# Patient Record
Sex: FEMALE | Race: WHITE | NOT HISPANIC OR LATINO | URBAN - METROPOLITAN AREA
[De-identification: names, ages, dates, MRNs, and addresses within clinical notes are randomized per-mention and may not be internally consistent; named-entity substitution may affect disease eponyms.]

---

## 2023-06-19 ENCOUNTER — EMERGENCY (EMERGENCY)
Facility: HOSPITAL | Age: 28
LOS: 1 days | Discharge: HOME | End: 2023-06-19
Attending: EMERGENCY MEDICINE | Admitting: EMERGENCY MEDICINE
Payer: COMMERCIAL

## 2023-06-19 VITALS
HEIGHT: 64 IN | SYSTOLIC BLOOD PRESSURE: 112 MMHG | RESPIRATION RATE: 16 BRPM | DIASTOLIC BLOOD PRESSURE: 78 MMHG | HEART RATE: 76 BPM | OXYGEN SATURATION: 97 % | TEMPERATURE: 99 F | WEIGHT: 108.03 LBS

## 2023-06-19 LAB — D DIMER BLD IA.RAPID-MCNC: <187 NG/ML DDU — SIGNIFICANT CHANGE UP

## 2023-06-19 PROCEDURE — 99284 EMERGENCY DEPT VISIT MOD MDM: CPT

## 2023-06-19 NOTE — ED PROVIDER NOTE - PHYSICAL EXAMINATION
PE: A&Ox3, well appearing, NAD, NCAT, regular respiratory effort, moving all four extremities equally. SILT and equal to BLE. No swelling to the BLE, no calf tenderness to palpation, no palpable cord in the LLE.

## 2023-06-19 NOTE — ED ADULT TRIAGE NOTE - CHIEF COMPLAINT QUOTE
Pt with complaint of numbness to the left leg that started around 1130pm on Saturday night. Reports she got a PRP injection to her left foot on Friday for achilles tendonitis. Pt concerned she may have a blood clot.  Denies any SOB, CP.

## 2023-06-19 NOTE — ED ADULT NURSE NOTE - AS PAIN REST
Condition:: Vein
Please Describe Your Condition:: is being seen for a chief complaint of a Vein. Patient complains of a vein on her right foot that is irritated. She presents for evaluation and management.
0 (no pain/absence of nonverbal indicators of pain)

## 2023-06-19 NOTE — ED PROVIDER NOTE - PATIENT PORTAL LINK FT
Daily Note     Today's date: 2022  Patient name: Jacqueline Rodrigez  : [birth year]  MRN: [id number]  Referring provider: Linnette Silva DO  Dx:   Encounter Diagnosis     ICD-10-CM    1  Left hip pain  M25 552      Start Time: 845  Stop Time: 930  Total time in clinic (min): 45 minutes  Subjective: Patient reports continuation of lateral thigh pain when turning over in bed, walking, and when moving her leg  Objective: See treatment diary below     Assessment: Tolerated treatment well  Educated patient on muscle imbalance and compensatory movements with moving into hip IR with ambulation and with heel slide for assessment  Significant difficulty with gluteal activation and hip ER muscle activation requiring tactile cueing for proper execution  Patient reported no pain with ambulation post session  Plan: Continue per plan of care  Insurance:  AMA/CMS Eval/ Re-eval POC expires Anisaanalia Farias #/ Referral # Total    Start date  Expiration date Extension  Co-Insurance   CMS 5 5 22 7 28 22                                  Precautions: HTN, OP  DOS 5 2 22  HEP: quad set, glute set, ankle pumps, stand hip abd, heel slide     Manuals 5 5 5 9 5 11 5 13 5 16 5 18 22   visit 1- post op 2 3 4 5 6   Soft tissue quad  FB FB np  FB desensitization                     Neuro Re-Ed         Feet together   5*10'' EC CCG 5*10'' EC CCG np    Clam shell      3*8 pillow b/w knees                     Ther Ex         HEP 10'        Pt edu    7'  10'   LAQ 10x 2*10 3*10 3*10 3*15 HOLD   Nu step st 6  5', lvl 1 6' lvl 1 7' lvl 1 7' lvl 1 7' lvl 1   Stand hip abd   2*10 LLE moving, 1*10 RLE moving 2*10 ea  2*10 ea  2*10 ea  2*10 LLE coupled with extn   Stand hip extn  2*10 3*10 ea  3*10 ea  3*10 ea      Calf raise  2*10 3*10 2*10 3*10 3*10   squats   2*10 2*10 3*10 3*10   Stand hip flexion   10x 2*10 2*10    Hip PROM   FB FB FB FB   Supine hip IR/ER rolling    FB     Supine hip add      3*10    Supine hip flexion     2*15    Side stepping 2 laps 15'    glute set      2*10 in supine and stand                              Ther Activity                           Gait Training         SPC   5' np  education            Modalities                             Short Term: from Date of surgery  1  Pt will report decreased levels of pain by at least 2 subjective ratings in 4 weeks  2  Pt will demonstrate improved ROM by at least 10 degrees in 4 weeks  3  Pt will demonstrate improved strength by 1/2 grade in 4 weeks  4  Pt will be able to ambulate without AD in 4 weeks  Long Term:   1  Pt will be independent in their HEP in 8 weeks  2  Pt will be pain free with IADL's in 8 weeks  3  Pt will be able to perform reciprocal stair navigation in 8 weeks  4  Pt will return to PLOF in 10 weeks with regards to recreational activities  You can access the FollowMyHealth Patient Portal offered by [facility] by registering at the following website: [email or web address]. By joining [employer]’s FollowMyHealth portal, you will also be able to view your health information using other applications (apps) compatible with our system.

## 2023-06-19 NOTE — ED PROVIDER NOTE - CLINICAL SUMMARY MEDICAL DECISION MAKING FREE TEXT BOX
28-year-old female no significant past medical history presents for evaluation of left lower leg paresthesias after receiving a PRP traction for Achilles tendinitis 2 days ago.  Patient noticed that she began having what she describes as a tingling sensation around her ankle and distal calf and became concerned that it might be something significant so came to the emergency department for further evaluation.  She denies chest pain, shortness of breath, leg swelling, motor changes, infectious symptoms.  Takes oral contraceptives but otherwise no risk factors for DVT.    MDM: Female patient with left lower leg paresthesias following PRP injection.  Likely due to mild nerve compression from inflammatory response due to injection, but will assess for potential DVT using a D-dimer as patient is very low risk and if negative discharge.  No central nervous system findings to be concerned of.

## 2023-06-19 NOTE — ED PROVIDER NOTE - NSFOLLOWUPINSTRUCTIONS_ED_ALL_ED_FT
Your blood test was negative and the chance of having a blood clot is negligible. Please follow up with your primary physician this week.     WHAT YOU NEED TO KNOW:    Paresthesia is numbness, tingling, or burning. It can happen in any part of your body, but usually occurs in your legs, feet, arms, or hands.    DISCHARGE INSTRUCTIONS:    Return to the emergency department if:     You have severe pain along with numbness and tingling.  Your legs suddenly become cold. You have trouble moving your legs, and they ache.  You have increased weakness in a part of your body.  You have uncontrolled movements.    Contact your healthcare provider or neurologist if:     Your symptoms do not improve.  You have symptoms in more than one part of your body.  You have questions or concerns about your condition or care.    Manage paresthesia:     Protect the area from injury. You may injure or burn yourself if you lose feeling in the area. Be careful when you touch anything that could be hot. Wear sturdy shoes to protect your feet. Ask about other ways to protect yourself.   Go to physical or occupational therapy if directed. Your provider may recommend therapy if you have a condition such as carpal tunnel syndrome. A physical therapist can teach you exercises to help strengthen the area or increase your ability to move it. An occupational therapist can help you find new ways to do your daily activities.  Manage health conditions that can cause paresthesia. Work with your diabetes specialist if you have uncontrolled diabetes. A dietitian or your healthcare provider can help you create a meal plan if you have low vitamin B levels. Your provider can help you manage your health if you have multiple sclerosis or you had a stroke. It is important to manage health conditions to stop paresthesia or prevent it from getting worse.  Follow up with your healthcare provider or neurologist as directed: Your healthcare provider may refer you to a specialist. Write down your questions so you remember to ask them during your visits.

## 2023-06-19 NOTE — ED PROVIDER NOTE - OBJECTIVE STATEMENT
28-year-old female no significant past medical history presents for evaluation of left lower leg paresthesias after receiving a PRP traction for Achilles tendinitis 2 days ago.  Patient noticed that she began having what she describes as a tingling sensation around her ankle and distal calf and became concerned that it might be something significant so came to the emergency department for further evaluation.  She denies chest pain, shortness of breath, leg swelling, motor changes, infectious symptoms.  Takes oral contraceptives but otherwise no risk factors for DVT.

## 2023-06-19 NOTE — ED ADULT NURSE NOTE - BEFAST ARM NUMBNESS
Use eyedrops or ointment in affected eyes as prescribed  Follow-up with Optometrist/Pediatrician in the next 1-2 days for further evaluation and treatment  Go to the ED if any  fevers, unable to stay hydrated, change in vision, headache, facial swelling redness warmth tenderness, eye pain, pain with eye movement, abdominal pain, chest pain, shortness of breath, new or worsening symptoms or other concerning symptoms  Conjunctivitis   AMBULATORY CARE:   Conjunctivitis,  or pink eye, is inflammation of your conjunctiva  The conjunctiva is a thin tissue that covers the front of your eye and the back of your eyelids  The conjunctiva helps protect your eye and keep it moist  Conjunctivitis may be caused by bacteria, allergies, or a virus  If your conjunctivitis is caused by bacteria, it may get better on its own in about 7 days  Viral conjunctivitis can last up to 3 weeks  Common symptoms may include any of the following: You will usually have symptoms in both eyes if your conjunctivitis is caused by allergies  You may also have other allergic symptoms, such as a rash or runny nose  Symptoms will usually start in 1 eye if your conjunctivitis is caused by a virus or bacteria  · Redness in the whites of your eye    · Itching in your eye or around your eye    · Feeling like there is something in your eye    · Watery or thick, sticky discharge    · Crusty eyelids when you wake up in the morning    · Burning, stinging, or swelling in your eye    · Pain when you see bright light  Seek care immediately if:   · You have worsening eye pain  · The swelling in your eye gets worse, even after treatment  · Your vision suddenly becomes worse or you cannot see at all  Contact your healthcare provider if:   · You develop a fever and ear pain  · You have tiny bumps or spots of blood on your eye  · You have questions or concerns about your condition or care    Treatment  will depend on the cause of your conjunctivitis  You may need antibiotics or allergy medicine as a pill, eye drop, or eye ointment  Manage your symptoms:   · Apply a cool compress  Wet a washcloth with cold water and place it on your eye  This will help decrease itching and irritation  · Do not wear contact lenses  They can irritate your eye  Throw away the pair you are using and ask when you can wear them again  Use a new pair of lenses when your healthcare provider says it is okay  · Avoid irritants  Stay away from smoke filled areas  Shield your eyes from wind and sun  · Flush your eye  You may need to flush your eye with saline to help decrease your symptoms  Ask for more information on how to flush your eye  Medicines:  Treatment depends on what is causing your conjunctivitis  You may be given any of the following:  · Allergy medicine  helps decrease itchy, red, swollen eyes caused by allergies  It may be given as a pill, eye drops, or nasal spray  · Antibiotics  may be needed if your conjunctivitis is caused by bacteria  This medicine may be given as a pill, eye drops, or eye ointment  · Take your medicine as directed  Contact your healthcare provider if you think your medicine is not helping or if you have side effects  Tell him or her if you are allergic to any medicine  Keep a list of the medicines, vitamins, and herbs you take  Include the amounts, and when and why you take them  Bring the list or the pill bottles to follow-up visits  Carry your medicine list with you in case of an emergency  Prevent the spread of conjunctivitis:   · Wash your hands with soap and water often  Wash your hands before and after you touch your eyes  Also wash your hands before you prepare or eat food and after you use the bathroom or change a diaper  · Avoid allergens  Try to avoid the things that cause your allergies, such as pets, dust, or grass  · Avoid contact with others  Do not share towels or washcloths   Try to stay away from others as much as possible  Ask when you can return to work or school  · Throw away eye makeup  The bacteria that caused your conjunctivitis can stay in eye makeup  Throw away mascara and other eye makeup  © 2017 [address] Information is for End User's use only and may not be sold, redistributed or otherwise used for commercial purposes  All illustrations and images included in CareNotes® are the copyrighted property of A D A M [employer] Inc  or Bharat Simmons  The above information is an  only  It is not intended as medical advice for individual conditions or treatments  Talk to your doctor, nurse or pharmacist before following any medical regimen to see if it is safe and effective for you  No

## 2023-06-22 DIAGNOSIS — R20.0 ANESTHESIA OF SKIN: ICD-10-CM

## 2023-06-22 DIAGNOSIS — R20.2 PARESTHESIA OF SKIN: ICD-10-CM

## 2023-06-22 DIAGNOSIS — Z88.0 ALLERGY STATUS TO PENICILLIN: ICD-10-CM

## 2023-06-22 DIAGNOSIS — J45.909 UNSPECIFIED ASTHMA, UNCOMPLICATED: ICD-10-CM

## 2023-06-22 DIAGNOSIS — Z88.1 ALLERGY STATUS TO OTHER ANTIBIOTIC AGENTS STATUS: ICD-10-CM

## 2023-12-05 ENCOUNTER — TRANSCRIPTION ENCOUNTER (OUTPATIENT)
Age: 28
End: 2023-12-05

## 2023-12-05 ENCOUNTER — INPATIENT (INPATIENT)
Facility: HOSPITAL | Age: 28
LOS: 0 days | Discharge: ANOTHER HOSPITAL | DRG: 74 | End: 2023-12-05
Attending: PSYCHIATRY & NEUROLOGY | Admitting: PSYCHIATRY & NEUROLOGY
Payer: COMMERCIAL

## 2023-12-05 VITALS
TEMPERATURE: 98 F | HEIGHT: 64 IN | HEART RATE: 78 BPM | OXYGEN SATURATION: 97 % | DIASTOLIC BLOOD PRESSURE: 78 MMHG | RESPIRATION RATE: 18 BRPM | WEIGHT: 110.01 LBS | SYSTOLIC BLOOD PRESSURE: 116 MMHG

## 2023-12-05 VITALS — TEMPERATURE: 99 F

## 2023-12-05 DIAGNOSIS — E87.6 HYPOKALEMIA: ICD-10-CM

## 2023-12-05 DIAGNOSIS — L70.9 ACNE, UNSPECIFIED: ICD-10-CM

## 2023-12-05 DIAGNOSIS — J45.909 UNSPECIFIED ASTHMA, UNCOMPLICATED: ICD-10-CM

## 2023-12-05 DIAGNOSIS — R20.0 ANESTHESIA OF SKIN: ICD-10-CM

## 2023-12-05 LAB
ALBUMIN SERPL ELPH-MCNC: 3.7 G/DL — SIGNIFICANT CHANGE UP (ref 3.4–5)
ALBUMIN SERPL ELPH-MCNC: 3.7 G/DL — SIGNIFICANT CHANGE UP (ref 3.4–5)
ALP SERPL-CCNC: 51 U/L — SIGNIFICANT CHANGE UP (ref 40–120)
ALP SERPL-CCNC: 51 U/L — SIGNIFICANT CHANGE UP (ref 40–120)
ALT FLD-CCNC: 27 U/L — SIGNIFICANT CHANGE UP (ref 12–42)
ALT FLD-CCNC: 27 U/L — SIGNIFICANT CHANGE UP (ref 12–42)
ANION GAP SERPL CALC-SCNC: 6 MMOL/L — LOW (ref 9–16)
ANION GAP SERPL CALC-SCNC: 6 MMOL/L — LOW (ref 9–16)
APTT BLD: 28.8 SEC — SIGNIFICANT CHANGE UP (ref 24.5–35.6)
APTT BLD: 28.8 SEC — SIGNIFICANT CHANGE UP (ref 24.5–35.6)
AST SERPL-CCNC: 21 U/L — SIGNIFICANT CHANGE UP (ref 15–37)
AST SERPL-CCNC: 21 U/L — SIGNIFICANT CHANGE UP (ref 15–37)
BASOPHILS # BLD AUTO: 0.06 K/UL — SIGNIFICANT CHANGE UP (ref 0–0.2)
BASOPHILS # BLD AUTO: 0.06 K/UL — SIGNIFICANT CHANGE UP (ref 0–0.2)
BASOPHILS NFR BLD AUTO: 0.7 % — SIGNIFICANT CHANGE UP (ref 0–2)
BASOPHILS NFR BLD AUTO: 0.7 % — SIGNIFICANT CHANGE UP (ref 0–2)
BILIRUB SERPL-MCNC: 0.2 MG/DL — SIGNIFICANT CHANGE UP (ref 0.2–1.2)
BILIRUB SERPL-MCNC: 0.2 MG/DL — SIGNIFICANT CHANGE UP (ref 0.2–1.2)
BUN SERPL-MCNC: 11 MG/DL — SIGNIFICANT CHANGE UP (ref 7–23)
BUN SERPL-MCNC: 11 MG/DL — SIGNIFICANT CHANGE UP (ref 7–23)
CALCIUM SERPL-MCNC: 8.9 MG/DL — SIGNIFICANT CHANGE UP (ref 8.5–10.5)
CALCIUM SERPL-MCNC: 8.9 MG/DL — SIGNIFICANT CHANGE UP (ref 8.5–10.5)
CHLORIDE SERPL-SCNC: 103 MMOL/L — SIGNIFICANT CHANGE UP (ref 96–108)
CHLORIDE SERPL-SCNC: 103 MMOL/L — SIGNIFICANT CHANGE UP (ref 96–108)
CO2 SERPL-SCNC: 30 MMOL/L — SIGNIFICANT CHANGE UP (ref 22–31)
CO2 SERPL-SCNC: 30 MMOL/L — SIGNIFICANT CHANGE UP (ref 22–31)
CREAT SERPL-MCNC: 0.76 MG/DL — SIGNIFICANT CHANGE UP (ref 0.5–1.3)
CREAT SERPL-MCNC: 0.76 MG/DL — SIGNIFICANT CHANGE UP (ref 0.5–1.3)
EGFR: 110 ML/MIN/1.73M2 — SIGNIFICANT CHANGE UP
EGFR: 110 ML/MIN/1.73M2 — SIGNIFICANT CHANGE UP
EOSINOPHIL # BLD AUTO: 0.21 K/UL — SIGNIFICANT CHANGE UP (ref 0–0.5)
EOSINOPHIL # BLD AUTO: 0.21 K/UL — SIGNIFICANT CHANGE UP (ref 0–0.5)
EOSINOPHIL NFR BLD AUTO: 2.6 % — SIGNIFICANT CHANGE UP (ref 0–6)
EOSINOPHIL NFR BLD AUTO: 2.6 % — SIGNIFICANT CHANGE UP (ref 0–6)
GLUCOSE BLDC GLUCOMTR-MCNC: 95 MG/DL — SIGNIFICANT CHANGE UP (ref 70–99)
GLUCOSE BLDC GLUCOMTR-MCNC: 95 MG/DL — SIGNIFICANT CHANGE UP (ref 70–99)
GLUCOSE SERPL-MCNC: 101 MG/DL — HIGH (ref 70–99)
GLUCOSE SERPL-MCNC: 101 MG/DL — HIGH (ref 70–99)
HCG SERPL-ACNC: <1 MIU/ML — SIGNIFICANT CHANGE UP
HCG SERPL-ACNC: <1 MIU/ML — SIGNIFICANT CHANGE UP
HCT VFR BLD CALC: 40 % — SIGNIFICANT CHANGE UP (ref 34.5–45)
HCT VFR BLD CALC: 40 % — SIGNIFICANT CHANGE UP (ref 34.5–45)
HGB BLD-MCNC: 14.1 G/DL — SIGNIFICANT CHANGE UP (ref 11.5–15.5)
HGB BLD-MCNC: 14.1 G/DL — SIGNIFICANT CHANGE UP (ref 11.5–15.5)
IMM GRANULOCYTES NFR BLD AUTO: 0.2 % — SIGNIFICANT CHANGE UP (ref 0–0.9)
IMM GRANULOCYTES NFR BLD AUTO: 0.2 % — SIGNIFICANT CHANGE UP (ref 0–0.9)
INR BLD: 0.88 — SIGNIFICANT CHANGE UP (ref 0.85–1.18)
INR BLD: 0.88 — SIGNIFICANT CHANGE UP (ref 0.85–1.18)
LYMPHOCYTES # BLD AUTO: 2.67 K/UL — SIGNIFICANT CHANGE UP (ref 1–3.3)
LYMPHOCYTES # BLD AUTO: 2.67 K/UL — SIGNIFICANT CHANGE UP (ref 1–3.3)
LYMPHOCYTES # BLD AUTO: 33 % — SIGNIFICANT CHANGE UP (ref 13–44)
LYMPHOCYTES # BLD AUTO: 33 % — SIGNIFICANT CHANGE UP (ref 13–44)
MAGNESIUM SERPL-MCNC: 2 MG/DL — SIGNIFICANT CHANGE UP (ref 1.6–2.6)
MAGNESIUM SERPL-MCNC: 2 MG/DL — SIGNIFICANT CHANGE UP (ref 1.6–2.6)
MCHC RBC-ENTMCNC: 32.3 PG — SIGNIFICANT CHANGE UP (ref 27–34)
MCHC RBC-ENTMCNC: 32.3 PG — SIGNIFICANT CHANGE UP (ref 27–34)
MCHC RBC-ENTMCNC: 35.3 GM/DL — SIGNIFICANT CHANGE UP (ref 32–36)
MCHC RBC-ENTMCNC: 35.3 GM/DL — SIGNIFICANT CHANGE UP (ref 32–36)
MCV RBC AUTO: 91.5 FL — SIGNIFICANT CHANGE UP (ref 80–100)
MCV RBC AUTO: 91.5 FL — SIGNIFICANT CHANGE UP (ref 80–100)
MONOCYTES # BLD AUTO: 0.99 K/UL — HIGH (ref 0–0.9)
MONOCYTES # BLD AUTO: 0.99 K/UL — HIGH (ref 0–0.9)
MONOCYTES NFR BLD AUTO: 12.2 % — SIGNIFICANT CHANGE UP (ref 2–14)
MONOCYTES NFR BLD AUTO: 12.2 % — SIGNIFICANT CHANGE UP (ref 2–14)
NEUTROPHILS # BLD AUTO: 4.15 K/UL — SIGNIFICANT CHANGE UP (ref 1.8–7.4)
NEUTROPHILS # BLD AUTO: 4.15 K/UL — SIGNIFICANT CHANGE UP (ref 1.8–7.4)
NEUTROPHILS NFR BLD AUTO: 51.3 % — SIGNIFICANT CHANGE UP (ref 43–77)
NEUTROPHILS NFR BLD AUTO: 51.3 % — SIGNIFICANT CHANGE UP (ref 43–77)
NRBC # BLD: 0 /100 WBCS — SIGNIFICANT CHANGE UP (ref 0–0)
NRBC # BLD: 0 /100 WBCS — SIGNIFICANT CHANGE UP (ref 0–0)
PLATELET # BLD AUTO: 254 K/UL — SIGNIFICANT CHANGE UP (ref 150–400)
PLATELET # BLD AUTO: 254 K/UL — SIGNIFICANT CHANGE UP (ref 150–400)
POTASSIUM SERPL-MCNC: 3.4 MMOL/L — LOW (ref 3.5–5.3)
POTASSIUM SERPL-MCNC: 3.4 MMOL/L — LOW (ref 3.5–5.3)
POTASSIUM SERPL-SCNC: 3.4 MMOL/L — LOW (ref 3.5–5.3)
POTASSIUM SERPL-SCNC: 3.4 MMOL/L — LOW (ref 3.5–5.3)
PROT SERPL-MCNC: 6.9 G/DL — SIGNIFICANT CHANGE UP (ref 6.4–8.2)
PROT SERPL-MCNC: 6.9 G/DL — SIGNIFICANT CHANGE UP (ref 6.4–8.2)
PROTHROM AB SERPL-ACNC: 10 SEC — SIGNIFICANT CHANGE UP (ref 9.5–13)
PROTHROM AB SERPL-ACNC: 10 SEC — SIGNIFICANT CHANGE UP (ref 9.5–13)
RBC # BLD: 4.37 M/UL — SIGNIFICANT CHANGE UP (ref 3.8–5.2)
RBC # BLD: 4.37 M/UL — SIGNIFICANT CHANGE UP (ref 3.8–5.2)
RBC # FLD: 11.6 % — SIGNIFICANT CHANGE UP (ref 10.3–14.5)
RBC # FLD: 11.6 % — SIGNIFICANT CHANGE UP (ref 10.3–14.5)
SODIUM SERPL-SCNC: 139 MMOL/L — SIGNIFICANT CHANGE UP (ref 132–145)
SODIUM SERPL-SCNC: 139 MMOL/L — SIGNIFICANT CHANGE UP (ref 132–145)
TROPONIN I, HIGH SENSITIVITY RESULT: 4.4 NG/L — SIGNIFICANT CHANGE UP
TROPONIN I, HIGH SENSITIVITY RESULT: 4.4 NG/L — SIGNIFICANT CHANGE UP
WBC # BLD: 8.1 K/UL — SIGNIFICANT CHANGE UP (ref 3.8–10.5)
WBC # BLD: 8.1 K/UL — SIGNIFICANT CHANGE UP (ref 3.8–10.5)
WBC # FLD AUTO: 8.1 K/UL — SIGNIFICANT CHANGE UP (ref 3.8–10.5)
WBC # FLD AUTO: 8.1 K/UL — SIGNIFICANT CHANGE UP (ref 3.8–10.5)

## 2023-12-05 PROCEDURE — 85610 PROTHROMBIN TIME: CPT

## 2023-12-05 PROCEDURE — 85025 COMPLETE CBC W/AUTO DIFF WBC: CPT

## 2023-12-05 PROCEDURE — 70498 CT ANGIOGRAPHY NECK: CPT

## 2023-12-05 PROCEDURE — 70450 CT HEAD/BRAIN W/O DYE: CPT | Mod: 26,59

## 2023-12-05 PROCEDURE — 99291 CRITICAL CARE FIRST HOUR: CPT

## 2023-12-05 PROCEDURE — 70450 CT HEAD/BRAIN W/O DYE: CPT

## 2023-12-05 PROCEDURE — 85730 THROMBOPLASTIN TIME PARTIAL: CPT

## 2023-12-05 PROCEDURE — 70496 CT ANGIOGRAPHY HEAD: CPT | Mod: 26

## 2023-12-05 PROCEDURE — 99223 1ST HOSP IP/OBS HIGH 75: CPT

## 2023-12-05 PROCEDURE — 84484 ASSAY OF TROPONIN QUANT: CPT

## 2023-12-05 PROCEDURE — 82962 GLUCOSE BLOOD TEST: CPT

## 2023-12-05 PROCEDURE — 36415 COLL VENOUS BLD VENIPUNCTURE: CPT

## 2023-12-05 PROCEDURE — 80053 COMPREHEN METABOLIC PANEL: CPT

## 2023-12-05 PROCEDURE — 99222 1ST HOSP IP/OBS MODERATE 55: CPT

## 2023-12-05 PROCEDURE — 93005 ELECTROCARDIOGRAM TRACING: CPT

## 2023-12-05 PROCEDURE — 0042T: CPT

## 2023-12-05 PROCEDURE — 83735 ASSAY OF MAGNESIUM: CPT

## 2023-12-05 PROCEDURE — 70551 MRI BRAIN STEM W/O DYE: CPT | Mod: 26

## 2023-12-05 PROCEDURE — 70496 CT ANGIOGRAPHY HEAD: CPT

## 2023-12-05 PROCEDURE — 70551 MRI BRAIN STEM W/O DYE: CPT

## 2023-12-05 PROCEDURE — 97165 OT EVAL LOW COMPLEX 30 MIN: CPT

## 2023-12-05 PROCEDURE — 84702 CHORIONIC GONADOTROPIN TEST: CPT

## 2023-12-05 PROCEDURE — 70498 CT ANGIOGRAPHY NECK: CPT | Mod: 26

## 2023-12-05 RX ORDER — ASPIRIN/CALCIUM CARB/MAGNESIUM 324 MG
81 TABLET ORAL DAILY
Refills: 0 | Status: DISCONTINUED | OUTPATIENT
Start: 2023-12-05 | End: 2023-12-05

## 2023-12-05 RX ORDER — SPIRONOLACTONE 25 MG/1
0 TABLET, FILM COATED ORAL
Refills: 0 | DISCHARGE

## 2023-12-05 RX ORDER — ATORVASTATIN CALCIUM 80 MG/1
80 TABLET, FILM COATED ORAL AT BEDTIME
Refills: 0 | Status: DISCONTINUED | OUTPATIENT
Start: 2023-12-05 | End: 2023-12-05

## 2023-12-05 RX ORDER — POTASSIUM CHLORIDE 20 MEQ
40 PACKET (EA) ORAL ONCE
Refills: 0 | Status: COMPLETED | OUTPATIENT
Start: 2023-12-05 | End: 2023-12-05

## 2023-12-05 RX ORDER — ENOXAPARIN SODIUM 100 MG/ML
40 INJECTION SUBCUTANEOUS EVERY 24 HOURS
Refills: 0 | Status: DISCONTINUED | OUTPATIENT
Start: 2023-12-05 | End: 2023-12-05

## 2023-12-05 RX ORDER — INFLUENZA VIRUS VACCINE 15; 15; 15; 15 UG/.5ML; UG/.5ML; UG/.5ML; UG/.5ML
0.5 SUSPENSION INTRAMUSCULAR ONCE
Refills: 0 | Status: DISCONTINUED | OUTPATIENT
Start: 2023-12-05 | End: 2023-12-05

## 2023-12-05 RX ADMIN — Medication 81 MILLIGRAM(S): at 13:22

## 2023-12-05 RX ADMIN — Medication 40 MILLIEQUIVALENT(S): at 05:15

## 2023-12-05 NOTE — PATIENT PROFILE ADULT - FALL HARM RISK - HARM RISK INTERVENTIONS
Assistance with ambulation/Assistance OOB with selected safe patient handling equipment/Communicate Risk of Fall with Harm to all staff/Discuss with provider need for PT consult/Monitor gait and stability/Provide patient with walking aids - walker, cane, crutches/Reinforce activity limits and safety measures with patient and family/Tailored Fall Risk Interventions/Visual Cue: Yellow wristband and red socks/Bed in lowest position, wheels locked, appropriate side rails in place/Call bell, personal items and telephone in reach/Instruct patient to call for assistance before getting out of bed or chair/Non-slip footwear when patient is out of bed/[place] to call system/Physically safe environment - no spills, clutter or unnecessary equipment/Purposeful Proactive Rounding/Room/bathroom lighting operational, light cord in reach Assistance with ambulation/Assistance OOB with selected safe patient handling equipment/Communicate Risk of Fall with Harm to all staff/Discuss with provider need for PT consult/Monitor gait and stability/Provide patient with walking aids - walker, cane, crutches/Reinforce activity limits and safety measures with patient and family/Tailored Fall Risk Interventions/Visual Cue: Yellow wristband and red socks/Bed in lowest position, wheels locked, appropriate side rails in place/Call bell, personal items and telephone in reach/Instruct patient to call for assistance before getting out of bed or chair/Non-slip footwear when patient is out of bed/[place] to call system/Physically safe environment - no spills, clutter or unnecessary equipment/Purposeful Proactive Rounding/Room/bathroom lighting operational, light cord in reach

## 2023-12-05 NOTE — ED ADULT NURSE NOTE - NSFALLUNIVINTERV_ED_ALL_ED
Bed/Stretcher in lowest position, wheels locked, appropriate side rails in place/Call bell, personal items and telephone in reach/Instruct patient to call for assistance before getting out of bed/chair/stretcher/Non-slip footwear applied when patient is off stretcher/[place] to call system/Physically safe environment - no spills, clutter or unnecessary equipment/Purposeful proactive rounding/Room/bathroom lighting operational, light cord in reach Bed/Stretcher in lowest position, wheels locked, appropriate side rails in place/Call bell, personal items and telephone in reach/Instruct patient to call for assistance before getting out of bed/chair/stretcher/Non-slip footwear applied when patient is off stretcher/[place] to call system/Physically safe environment - no spills, clutter or unnecessary equipment/Purposeful proactive rounding/Room/bathroom lighting operational, light cord in reach

## 2023-12-05 NOTE — DISCHARGE NOTE PROVIDER - PROVIDER TOKENS
PROVIDER:[TOKEN:[[id number]],SCHEDULEDAPPT:[12/19/2023],SCHEDULEDAPPTTIME:[10:00 AM]] PROVIDER:[TOKEN:[[id number]],SCHEDULEDAPPT:[12/19/2023],SCHEDULEDAPPTTIME:[10:00 AM]]

## 2023-12-05 NOTE — OCCUPATIONAL THERAPY INITIAL EVALUATION ADULT - SIT-TO-STAND BALANCE
Post-Care Instructions: CRYOSURGERY\\n\\n\\nThe procedure you have just had using liquid nitrogen is called cryosurgery.  Liquid nitrogen is minus (-) 195.8O C and must be stored in special containers.  It evaporates on contact with the air and becomes water vapor, which is why it appears to smoke.\\n\\nCryosurgery is a surgical technique that avoids cutting, burning or the need for anesthesia.  For selected lesions or growths, cryosurgery is the best treatment because of its safety, minimal post-surgical care and excellent cosmetic results.\\n\\nFollowing treatment, the lesion or growth will appear unchanged.  Soon afterwards, the area will become red and slightly swollen.  Within 24 to 48 hours, a blister or water bubble often appears.  THIS IS NORMAL AND EXPECTED.  If left alone, the blister will slowly resolve and the entire area will turn into a scab.  Regardless of whether the blister breaks or not, the healing will continue as expected.  The scab will fall off by itself when it is ready.  From the day of cryosurgery to the day when the scab falls off is usually about three weeks.  A faint pink spot will be present that will slowly fade away.\\n\\nYou may carry on normal activities immediately after therapy including bathing.  There are no restrictions, however, you should be careful not to irritate or traumatize the area.\\n\\nWhen cryotherapy is used for warts, keep in mind that warts may be very stubborn!  The virus causing the wart may be a strong strain, so the treatment may need to be repeated.  Usually you will know if the wart is still present within three to four weeks, so you may return for another treatment.\\n\\nIf there are any problems or questions, please call our office. Duration Of Freeze Thaw-Cycle (Seconds): 0 Render Post-Care Instructions In Note?: no Detail Level: Detailed Consent: The patient's consent was obtained including but not limited to risks of crusting, scabbing, blistering, scarring, darker or lighter pigmentary change, recurrence, incomplete removal and infection. good balance

## 2023-12-05 NOTE — DISCHARGE NOTE NURSING/CASE MANAGEMENT/SOCIAL WORK - PATIENT PORTAL LINK FT
You can access the FollowMyHealth Patient Portal offered by [facility] by registering at the following website: [email or web address]. By joining [employer]’s FollowMyHealth portal, you will also be able to view your health information using other applications (apps) compatible with our system. You can access the FollowMyHealth Patient Portal offered by [facility] by registering at the following website: [email or web address]. By joining [employer]s FollowMyHealth portal, you will also be able to view your health information using other applications (apps) compatible with our system.

## 2023-12-05 NOTE — OCCUPATIONAL THERAPY INITIAL EVALUATION ADULT - LIVES WITH, PROFILE
Pt lives alone in 1st floor walk-up. Pt at baseline is ind for ADLs and functional mobility. No DME. + R handed and wears glasses for distance/alone

## 2023-12-05 NOTE — DISCHARGE NOTE PROVIDER - HOSPITAL COURSE
Hospital course:  28y Female with PMH     Discharge NIHSS:     During this hospital course, patient did not have a stroke.    Patient had the following workup done in house:  CT Brain Stroke Protocol (12.05.23 @ 02:50)   IMPRESSION: No acute intracranial hemorrhage, acute transcortical   infarction, extra-axial fluid collection, or hydrocephalus.    CT Angio Head and Neck Stroke Protocol w/ IV Cont (12.05.23 @ 03:17)   IMPRESSION: No steno-occlusive disease. No evidence of arterial dissection.    CT Brain Perfusion Maps Stroke (12.05.23 @ 03:17)   IMPRESSION: Matching decreased CBF and increased Tmax within the right   frontal lobe involving the right MCA territory. Findings concerning for   acute ischemia. Follow-up the report of the CTA head and neck.    MR Head No Cont (12.05.23 @ 13:53)   IMPRESSION: No acute infarct.    []echo  []labs  []other    Physical exam at discharge:    New medications on discharge:  Labs to be followed up:  Imaging to be done as outpatient:  Further outpatient workup:   Hospital course:  28y Female with PMH female with no significant past medical history, recently started 1 month ago on spironolactone for acne otherwise takes Loestrin for birth control presents after waking up at 1 AM with numbness and tingling to the right upper extremity.  Patient went to sleep at 11 PM with no symptoms and awoke at 1 AM with the symptoms. The patient states that she occasionally sleeps on her arm. Patient denies any associated neck pain, headache, speech or visual changes, denies lower extremity symptoms and denies upper extremity or lower extremity weakness.  She states that the numbness is confined to the dorsal aspect of her arm, with regained sensation by her shoulder and fingers. She states the numbness started "all over," rather than peripherally. In June 2023 she was seen in the ED for left lower extremity decreased sensation but that was in the setting of Achilles tendinitis.  Patient notes a week ago she also experienced some right-sided lower extremity symptoms that were vague and with tingling but that went away after a day.  Patient denies any recent injuries but states she had a concussion 1 month ago.  She is right-hand dominant. Endorses a positive family history of strokes, MIs, and DVTs on father's side, and miscarriages on mother's side. In the ED, CTH and CTA were negative, and CTP showed matching decreased CBF and increased Tmax in the right frontal lobe (R MCA territory).      Discharge NIHSS: 1    During this hospital course, patient did not have a stroke.    Patient had the following workup done in house:  CT Brain Stroke Protocol (12.05.23 @ 02:50)   IMPRESSION: No acute intracranial hemorrhage, acute transcortical   infarction, extra-axial fluid collection, or hydrocephalus.    CT Angio Head and Neck Stroke Protocol w/ IV Cont (12.05.23 @ 03:17)   IMPRESSION: No steno-occlusive disease. No evidence of arterial dissection.    CT Brain Perfusion Maps Stroke (12.05.23 @ 03:17)   IMPRESSION: Matching decreased CBF and increased Tmax within the right   frontal lobe involving the right MCA territory. Findings concerning for   acute ischemia. Follow-up the report of the CTA head and neck.    MR Head No Cont (12.05.23 @ 13:53)   IMPRESSION: No acute infarct.    Physical exam at discharge:  -Mental status: Awake, alert, oriented to person, place, and time. Speech is fluent with intact naming, repetition, and comprehension, no dysarthria. Recent and remote memory intact. Follows commands. Attention/concentration intact. Fund of knowledge appropriate.  -Cranial nerves:   II: Visual fields are full to confrontation.  III, IV, VI: Extraocular movements are intact without nystagmus. Pupils equally round and reactive to light  V:  Facial sensation V1-V3 equal and intact   VII: Face is symmetric with normal eye closure and smile  VIII: Hearing is bilaterally intact   Motor: Normal bulk and tone. No pronator drift. Strength bilateral upper extremity 5/5, bilateral lower extremities 5/5.  Sensation: Decreased sensation on lateral right arm only (spares hand). No neglect or extinction on double simultaneous testing.  Coordination: No dysmetria on finger-to-nose bilaterally    New medications on discharge:  Labs to be followed up:  Imaging to be done as outpatient:  Further outpatient workup: consider MRI cspine if symptoms do not improve   Hospital course:  28-year-old female with no significant past medical history, recently started 1 month ago on spironolactone otherwise takes Loestrin for birth control presents after waking up at 1 AM with numbness and tingling to the right upper extremity.  Patient went to sleep at 11 PM with no symptoms and awoke at 1 AM with the symptoms. Positive family history of stroke, MI, miscarriages, and DVT/PE. CTH and CTA negative, CTP showed matching decreased CBF and increased Tmax in right frontal lobe. Admitted to stroke tele for further management. Mri negative for stroke. Likely peripheral etiology. If symptoms do not improve, can obtain MRI c-spine on follow up visit.    Discharge NIHSS: 1    During this hospital course, patient did not have a stroke.    Patient had the following workup done in house:  CT Brain Stroke Protocol (12.05.23 @ 02:50)   IMPRESSION: No acute intracranial hemorrhage, acute transcortical   infarction, extra-axial fluid collection, or hydrocephalus.    CT Angio Head and Neck Stroke Protocol w/ IV Cont (12.05.23 @ 03:17)   IMPRESSION: No steno-occlusive disease. No evidence of arterial dissection.    CT Brain Perfusion Maps Stroke (12.05.23 @ 03:17)   IMPRESSION: Matching decreased CBF and increased Tmax within the right   frontal lobe involving the right MCA territory. Findings concerning for   acute ischemia. Follow-up the report of the CTA head and neck.    MR Head No Cont (12.05.23 @ 13:53)   IMPRESSION: No acute infarct.    Physical exam at discharge:  -Mental status: Awake, alert, oriented to person, place, and time. Speech is fluent with intact naming, repetition, and comprehension, no dysarthria. Recent and remote memory intact. Follows commands. Attention/concentration intact. Fund of knowledge appropriate.  -Cranial nerves:   II: Visual fields are full to confrontation.  III, IV, VI: Extraocular movements are intact without nystagmus. Pupils equally round and reactive to light  V:  Facial sensation V1-V3 equal and intact   VII: Face is symmetric with normal eye closure and smile  VIII: Hearing is bilaterally intact   Motor: Normal bulk and tone. No pronator drift. Strength bilateral upper extremity 5/5, bilateral lower extremities 5/5.  Sensation: Decreased sensation on lateral right arm only (spares hand). No neglect or extinction on double simultaneous testing.  Coordination: No dysmetria on finger-to-nose bilaterally    New medications on discharge: none  Labs to be followed up:none  Imaging to be done as outpatient: none  Further outpatient workup: consider MRI cspine if symptoms do not improve

## 2023-12-05 NOTE — DISCHARGE NOTE PROVIDER - NSDCCPCAREPLAN_GEN_ALL_CORE_FT
PRINCIPAL DISCHARGE DIAGNOSIS  Diagnosis: Cervical radiculopathy  Assessment and Plan of Treatment: Your symptoms of right arm numbness was concerning for a stroke. MRI was negative for any stroke. Symptoms most likely from pinched nerve. Try to avoid sleeping on the arm to see if symptoms improve. Please follow up with neurology clinic outpatient. If symptoms do not improve at that time, can obtain further imaging for nerve involvement.

## 2023-12-05 NOTE — CONSULT NOTE ADULT - PROBLEM SELECTOR RECOMMENDATION 9
Pt. also reports h/o R leg numbness in the past, h/o tendinitis; MRI brain unremarkable, no e/o stroke; cont. work-up and mgmt per Neuro, likely outpatient OT and f/u w/ General Neurology if sx persist

## 2023-12-05 NOTE — ED ADULT TRIAGE NOTE - CHIEF COMPLAINT QUOTE
Pt walked into ER c/o numbness to right arm started appx 0100 tonight. Pt reports she though it was the way she was laying/sleeping on it. BEFAST negative, denies dizziness, visual issues, nv, weakness, ambulating w/o issue, or further associated complaints at triage.-PMH.

## 2023-12-05 NOTE — PATIENT PROFILE ADULT - FALL HARM RISK - ATTEMPT OOB
87 y/o M w/ PMH of HTN, parkinsons, GERD, CVA, syncope, BPH presenting w/ increased secretions. Seen w/ wife and daughter. Reports over the past few days having cough w/ copious amount of secretions. Last night was concerned that he would choke, he didn't, but didn't think he could stay home another night so brought him to the ED today for eval. [facility] and [facility] were told he had some "congestion" in his lungs. Primarily bed bound. Denies fevers, chills, headache, dizziness, blurred vision, chest pain, shortness of breath, abdominal pain, n/v/d/c, urinary symptoms, MSK pain, rash  89 y/o M w/ PMH of HTN, parkinsons, GERD, CVA, syncope, BPH presenting w/ increased secretions. Seen w/ wife and daughter. Reports over the past few days having cough w/ copious amount of secretions. Last night was concerned that he would choke, he didn't, but didn't think he could stay home another night so brought him to the ED today for eval. Reports [facility] and they were told he had some "congestion" in his lungs. Primarily bed bound. Denies fevers, chills, headache, dizziness, blurred vision, chest pain, shortness of breath, abdominal pain, n/v/d/c, urinary symptoms, MSK pain, rash      87 y/o M w/ PMH of HTN, parkinsons, GERD, CVA, syncope, BPH presenting w/ increased secretions. Seen w/ wife and daughter. Reports over the past few days having cough w/ copious amount of secretions. Last night was concerned that he would choke, he didn't, but didn't think he could stay home another night so brought him to the ED today for eval. Reports [facility] and they were told he had some "congestion" in his lungs. Primarily bed bound. Denies fevers, chills, headache, dizziness, blurred vision, chest pain, shortness of breath, abdominal pain, n/v/d/c, urinary symptoms, MSK pain, rash    in ED temp 100.9 , leukocytosis 11,000, CT chest reviewed , EKG   received ceftriaxone and zithromax   pt seen on floor , awake, oriented x1 to self, comfortable, denies sob or chest pain, at this time denies cough, follows 1 step commands         89 y/o M w/ PMH of HTN, parkinsons, GERD, CVA, syncope, BPH presenting w/ increased secretions. Seen w/ wife and daughter. Reports over the past few days having cough w/ copious amount of secretions. Last night was concerned that he would choke, he didn't, but didn't think he could stay home another night so brought him to the ED today for eval. Reports [facility] and they were told he had some "congestion" in his lungs. Primarily bed bound. Denies fevers, chills, headache, dizziness, blurred vision, chest pain, shortness of breath, abdominal pain, n/v/d/c, urinary symptoms, MSK pain, rash    in ED temp 100.9 , leukocytosis 11,000, CT chest reviewed , EKG NSR , nonspecifc ST/t waves , anteroseptal infarct similar to ekg done march 1st 2018  received ceftriaxone and zithromax   pt seen on floor , awake, oriented x1 to self, comfortable, denies sob or chest pain, at this time denies cough, follows 1 step commands        No

## 2023-12-05 NOTE — PHYSICAL THERAPY INITIAL EVALUATION ADULT - PERTINENT HX OF CURRENT PROBLEM, REHAB EVAL
Pt. is a 28 y.o R hand dominant  female p/w new onset RUE numbness/tingling. Pt. has been admitted for further neurological w/u.

## 2023-12-05 NOTE — H&P ADULT - ASSESSMENT
Patient is a 28-year-old female with no significant past medical history, recently started 1 month ago on spironolactone otherwise takes Loestrin for birth control presents after waking up at 1 AM with numbness and tingling to the right upper extremity.  Patient went to sleep at 11 PM with no symptoms and awoke at 1 AM with the symptoms. Positive family history of stroke, MI, miscarriages, and DVT/PE. CTH and CTA negative, CTP showed matching decreased CBF and increased Tmax in right frontal lobe. MRI showed no acute infarct.    Plan:  - continue aspirin 81 mg  - obtain TTE Patient is a 28-year-old female with no significant past medical history, recently started 1 month ago on spironolactone otherwise takes Loestrin for birth control presents after waking up at 1 AM with numbness and tingling to the right upper extremity.  Patient went to sleep at 11 PM with no symptoms and awoke at 1 AM with the symptoms. Positive family history of stroke, MI, miscarriages, and DVT/PE. CTH and CTA negative, CTP showed matching decreased CBF and increased Tmax in right frontal lobe. Admitted to stroke tele for further management.     Neuro  #CVA workup  - continue aspirin 81mg daily   - continue atorvastatin 80mg daily  - q4hr stroke neuro checks and vitals  - obtain MRI Brain without contrast  - Stroke Code HCT Results: negative   - Stroke Code CTA Results: negative   - Stroke Code CTP Results: elevated TMAX in the R frontal lobe  - Stroke education    Cards  #HTN  - permissive hypertension, Goal -180  - Can consider TTE with bubble if MRI is positive  - Stroke Code EKG Results:    #HLD  - high dose statin as above in CVA  - LDL results: pending    Pulm  - call provider if SPO2 < 94%    GI  #Nutrition/Fluids/Electrolytes   - replete K<4 and Mg <2  - Diet: DASH/TLC   - IVF: None    Renal  - daily BMPs    Infectious Disease  - afebrile on admission    Endocrine  #DM  - A1C results: pending    - TSH results: pending    DVT Prophylaxis  - lovenox sq for DVT prophylaxis   - SCDs for DVT prophylaxis       Dispo: pending PT/OT     Discussed daily hospital plans and goals with patient and family at bedside. (Called and updated family.)    Discussed with Neurology Attending, Dr. Lord

## 2023-12-05 NOTE — ED ADULT NURSE NOTE - OBJECTIVE STATEMENT
Pt reports at 0100 she woke up with right arm numbness. Denies weakness, dizziness, visual changes, ambulatory changes.

## 2023-12-05 NOTE — OCCUPATIONAL THERAPY INITIAL EVALUATION ADULT - DIAGNOSIS, OT EVAL
Pt p/w R UE numbness from shoulder distal to fingers demonstrating at baseline/WFL for ADLs and functional mobility

## 2023-12-05 NOTE — ED PROVIDER NOTE - OBJECTIVE STATEMENT
Patient is a 28-year-old female with no significant past medical history, recently started 1 month ago on spironolactone otherwise takes Loestrin for birth control presents after waking up at 1 AM with numbness and tingling to the right upper extremity.  Patient went to sleep at 11 PM with no symptoms and awoke at 1 AM with the symptoms.  Patient denies any associated neck pain, headache, speech or visual changes, denies lower extremity symptoms and denies upper extremity or lower extremity weakness.  Denies prior similar episodes.  In June 2023 she was seen in the ED for left lower extremity decree sensation but that was in the setting of Achilles tendinitis.  Patient notes a week ago she also experienced some right-sided lower extremity symptoms that were vague and with tingling but that went away within an hour.  Patient denies any recent injuries.  She is right-hand dominant.  Denies chest pain shortness of breath abdominal pain nausea vomiting diarrhea.  Patient is a non-smoker only social alcohol denies drugs.

## 2023-12-05 NOTE — ED PROVIDER NOTE - PROGRESS NOTE DETAILS
spoke to BETTYE Callahan, patient out of 4.5 hour window for TNK, recommends if nihss 1 and numbness improving, does not suspect LVO. recommend stroke work up either with TIA obs or admit for stroke work up. regarding perfusion scan, symptoms laterality do not correlate with R mca as that would correlate with L sided symptoms. Explained plan of care to patient she continues to have numbness of right upper extremity with very minimal improvement, will admit if numbness is not resolved CTA of head and neck are negative. Presented case to Dr. Angeles accepting patient to neuro telemetry for workup

## 2023-12-05 NOTE — ED PROVIDER NOTE - NEUROLOGICAL, MLM
Alert and oriented, no focal deficits, no motor deficits. there is decreased sensation to RUE diffuse distal median radial ulnar nn intact to motor and sensory.

## 2023-12-05 NOTE — H&P ADULT - HISTORY OF PRESENT ILLNESS
Patient is a 28-year-old female with no significant past medical history, recently started 1 month ago on spironolactone for acne otherwise takes Loestrin for birth control presents after waking up at 1 AM with numbness and tingling to the right upper extremity.  Patient went to sleep at 11 PM with no symptoms and awoke at 1 AM with the symptoms. The patient states that she occasionally sleeps on her arm. Patient denies any associated neck pain, headache, speech or visual changes, denies lower extremity symptoms and denies upper extremity or lower extremity weakness.  She states that the numbness is confined to the dorsal aspect of her arm, with regained sensation by her shoulder and fingers. She states the numbness started "all over," rather than peripherally. In June 2023 she was seen in the ED for left lower extremity decreased sensation but that was in the setting of Achilles tendinitis.  Patient notes a week ago she also experienced some right-sided lower extremity symptoms that were vague and with tingling but that went away after a day.  Patient denies any recent injuries but states she had a concussion 1 month ago.  She is right-hand dominant.  Patient endorses an episode of "food poisoning" one month ago.  Patient is a non-smoker only social alcohol denies drugs. Endorses a positive family history of strokes, MIs, and DVTs on father's side, and miscarriages on mother's side. In the ED, CTH and CTA were negative, and CTP showed matching decreased CBF and increased Tmax in the right frontal lobe (R MCA territory). *** STROKE HPI ****    Patient is a 28-year-old female with no significant past medical history, recently started 1 month ago on spironolactone for acne otherwise takes Loestrin for birth control presents after waking up at 1 AM with numbness and tingling to the right upper extremity.  Patient went to sleep at 11 PM with no symptoms and awoke at 1 AM with the symptoms. The patient states that she occasionally sleeps on her arm. Patient denies any associated neck pain, headache, speech or visual changes, denies lower extremity symptoms and denies upper extremity or lower extremity weakness.  She states that the numbness is confined to the dorsal aspect of her arm, with regained sensation by her shoulder and fingers. She states the numbness started "all over," rather than peripherally. In June 2023 she was seen in the ED for left lower extremity decreased sensation but that was in the setting of Achilles tendinitis.  Patient notes a week ago she also experienced some right-sided lower extremity symptoms that were vague and with tingling but that went away after a day.  Patient denies any recent injuries but states she had a concussion 1 month ago.  She is right-hand dominant.  Patient endorses an episode of "food poisoning" one month ago.  Patient is a non-smoker only social alcohol denies drugs. Endorses a positive family history of strokes, MIs, and DVTs on father's side, and miscarriages on mother's side. In the ED, CTH and CTA were negative, and CTP showed matching decreased CBF and increased Tmax in the right frontal lobe (R MCA territory). Patient is a 28-year-old female with no significant past medical history, recently started 1 month ago on spironolactone for acne otherwise takes Loestrin for birth control presents after waking up at 1 AM with numbness and tingling to the right upper extremity.  Patient went to sleep at 11 PM with no symptoms and awoke at 1 AM with the symptoms. The patient states that she occasionally sleeps on her arm. Patient denies any associated neck pain, headache, speech or visual changes, denies lower extremity symptoms and denies upper extremity or lower extremity weakness.  She states that the numbness is confined to the dorsal aspect of her arm, with regained sensation by her shoulder and fingers. She states the numbness started "all over," rather than peripherally. In June 2023 she was seen in the ED for left lower extremity decreased sensation but that was in the setting of Achilles tendinitis.  Patient notes a week ago she also experienced some right-sided lower extremity symptoms that were vague and with tingling but that went away after a day.  Patient denies any recent injuries but states she had a concussion 1 month ago.  She is right-hand dominant.  Patient endorses an episode of "food poisoning" one month ago.  Patient is a non-smoker only social alcohol denies drugs. Endorses a positive family history of strokes, MIs, and DVTs on father's side, and miscarriages on mother's side. In the ED, CTH and CTA were negative, and CTP showed matching decreased CBF and increased Tmax in the right frontal lobe (R MCA territory).     **STROKE CODE CONSULT NOTE**    Last known well time/Time of onset of symptoms:    HPI: 28y Female with PMHx of     T(C): 37.3 (12-05-23 @ 14:17), Max: 37.3 (12-05-23 @ 14:17)  HR: 94 (12-05-23 @ 12:50) (71 - 94)  BP: 112/78 (12-05-23 @ 12:50) (99/63 - 128/69)  RR: 16 (12-05-23 @ 12:50) (16 - 18)  SpO2: 96% (12-05-23 @ 12:50) (96% - 100%)    PAST MEDICAL & SURGICAL HISTORY:  Asthma          FAMILY HISTORY:      SOCIAL HISTORY:   Patient lives with *** at ***.   Smoking status:  Drinking:  Drug Use:     ROS: ***  Constitutional: No fever, weight loss or fatigue  Eyes: No eye pain, visual disturbances, or discharge  ENMT:  No difficulty hearing, tinnitus; No sinus or throat pain  Neck: No pain or stiffness  Respiratory: No cough, wheezing, chills or hemoptysis  Cardiovascular: No chest pain, palpitations, shortness of breath, or leg swelling  Gastrointestinal: No abdominal pain. No nausea, vomiting or hematemesis; No diarrhea or constipation. Nohematochezia.  Genitourinary: No dysuria, frequency, hematuria or incontinence  Neurological: As per HPI  Skin: No itching, burning, rashes or lesions   Endocrine: No heat or cold intolerance; No hair loss  Musculoskeletal: No joint pain or swelling; No muscle, back or extremity pain  Heme/Lymph: No easy bruising or bleeding gums    MEDICATIONS  (STANDING):  aspirin enteric coated 81 milliGRAM(s) Oral daily  atorvastatin 80 milliGRAM(s) Oral at bedtime  enoxaparin Injectable 40 milliGRAM(s) SubCutaneous every 24 hours  influenza   Vaccine 0.5 milliLiter(s) IntraMuscular once    MEDICATIONS  (PRN):    Allergies    penicillin (Unknown)  clindamycin (Angioedema)  amoxicillin (Unknown)    Intolerances      Vital Signs Last 24 Hrs  T(C): 37.3 (05 Dec 2023 14:17), Max: 37.3 (05 Dec 2023 14:17)  T(F): 99.2 (05 Dec 2023 14:17), Max: 99.2 (05 Dec 2023 14:17)  HR: 94 (05 Dec 2023 12:50) (71 - 94)  BP: 112/78 (05 Dec 2023 12:50) (99/63 - 128/69)  BP(mean): 91 (05 Dec 2023 12:50) (91 - 97)  RR: 16 (05 Dec 2023 12:50) (16 - 18)  SpO2: 96% (05 Dec 2023 12:50) (96% - 100%)    Parameters below as of 05 Dec 2023 12:50  Patient On (Oxygen Delivery Method): room air        Physical exam:  Constitutional: No acute distress, conversant  Eyes: Anicteric sclerae, moist conjunctivae, see below for CNs  Neck: trachea midline, FROM, supple, no thyromegaly or lymphadenopathy  Cardiovascular: Regular rate and rhythm, no murmurs, rubs, or gallops. No carotid bruits.   Pulmonary: Anterior breath sounds clear bilaterally, no crackles or wheezing. No use of accessory muscles  GI: Abdomen soft, non-distended, non-tender  Extremities: Radial and DP pulses +2, no edema    Neurologic:  -Mental status: Awake, alert, oriented to person, place, and time. Speech is fluent with intact naming, repetition, and comprehension, no dysarthria. Recent and remote memory intact. Follows commands. Attention/concentration intact. Fund of knowledge appropriate.  -Cranial nerves:   II: Visual fields are full to confrontation.  III, IV, VI: Extraocular movements are intact without nystagmus. Pupils equally round and reactive to light  V:  Facial sensation V1-V3 equal and intact   VII: Face is symmetric with normal eye closure and smile  VIII: Hearing is bilaterally intact to finger rub  IX, X: Uvula is midline and soft palate rises symmetrically  XI: Head turning and shoulder shrug are intact.  XII: Tongue protrudes midline  Motor: Normal bulk and tone. No pronator drift. Strength bilateral upper extremity 5/5, bilateral lower extremities 5/5.  Rapid alternating movements intact and symmetric  Sensation: Intact to light touch bilaterally. No neglect or extinction on double simultaneous testing.  Coordination: No dysmetria on finger-to-nose and heel-to-shin bilaterally  Reflexes: Downgoing toes bilaterally   Gait: Narrow gait and steady    NIHSS: **** ASPECT Score: ***** ICH Score: ****** (GCS)    Fingerstick Blood Glucose: CAPILLARY BLOOD GLUCOSE      POCT Blood Glucose.: 95 mg/dL (05 Dec 2023 02:51)    LABS:                        14.1   8.10  )-----------( 254      ( 05 Dec 2023 02:46 )             40.0     12-05    139  |  103  |  11  ----------------------------<  101<H>  3.4<L>   |  30  |  0.76    Ca    8.9      05 Dec 2023 02:46  Mg     2.0     12-05    TPro  6.9  /  Alb  3.7  /  TBili  0.2  /  DBili  x   /  AST  21  /  ALT  27  /  AlkPhos  51  12-05    PT/INR - ( 05 Dec 2023 02:46 )   PT: 10.0 sec;   INR: 0.88          PTT - ( 05 Dec 2023 02:46 )  PTT:28.8 sec      Urinalysis Basic - ( 05 Dec 2023 02:46 )    Color: x / Appearance: x / SG: x / pH: x  Gluc: 101 mg/dL / Ketone: x  / Bili: x / Urobili: x   Blood: x / Protein: x / Nitrite: x   Leuk Esterase: x / RBC: x / WBC x   Sq Epi: x / Non Sq Epi: x / Bacteria: x        RADIOLOGY & ADDITIONAL STUDIES:      -----------------------------------------------------------------------------------------------------------------  IV-tPA (Y/N):    ***                              Bolus time:    Alteplase Dose Verification w/ RN:  Reason IV-tPA not given: ***

## 2023-12-05 NOTE — H&P ADULT - NS ATTEND AMEND GEN_ALL_CORE FT
The patient is a 28 year old female with no significant PMH admitted for stroke r/o after presenting with paresthesias over the ventral aspect of the R UE in dermatomal pattern without associated weakness or other symptoms on the back ground of transient ?R LE symptoms in the recent past. Initial CTH/A negative. CTP with ? R hemispheric perfusion deficit, but MRI negative despite persistent symptoms. Suspect peripheral etiology. Can consider MRI C spine if symptoms do not improve.

## 2023-12-05 NOTE — PHYSICAL THERAPY INITIAL EVALUATION ADULT - MODALITIES TREATMENT COMMENTS
No cranial nerve abnormalities identified; speech fluent w/o word finding difficulties; tandem/toe/heel walk intact.

## 2023-12-05 NOTE — OCCUPATIONAL THERAPY INITIAL EVALUATION ADULT - RANGE OF MOTION EXAMINATION, LOWER EXTREMITY
Pt assisted to sitting position with two assist, cries out loudly, and flops back to bed. C/o severe pain. Abdominal binder obtained, pillow splinting reinforced, as well as breathing. Pt allowed to rest, and then sitting up reattempted. Pt able to get to sitting, but becomes cool, clammy and pale. Cool cloth applied to neck. Remains in sitting position for >5minutes, with two staff at side. Able to stand, abd binder applied, and pt assisted with two assist to ch. Tolerated fairly well. Pt states she feels better sitting up.   
bilateral LE Active ROM was WFL  (within functional limits)

## 2023-12-05 NOTE — ED PROVIDER NOTE - CLINICAL SUMMARY MEDICAL DECISION MAKING FREE TEXT BOX
Patient is a 28-year-old female with no significant past medical history, recently started 1 month ago on spironolactone otherwise takes Loestrin for birth control presents after waking up at 1 AM with numbness and tingling to the right upper extremity.  Patient went to sleep at 11 PM with no symptoms and awoke at 1 AM with the symptoms.     Although patient was beat fast negative in triage after my evaluation I called a code stroke as noted in the stroke note, patient with NIH stroke scale of 1 for decreased sensation to right upper extremity otherwise nonfocal.  Plan is to do stroke imaging, check out electrolytes rule out any electrolyte imbalance, check pregnancy, and dispo pending results

## 2023-12-05 NOTE — DISCHARGE NOTE NURSING/CASE MANAGEMENT/SOCIAL WORK - NSDCPETBCESMAN_GEN_ALL_CORE
Called [phone] (home). Left message on voicemail, using  services to return phone call to triage.  Mari Pond RN CPC Triage.     If you are a smoker, it is important for your health to stop smoking. Please be aware that second hand smoke is also harmful.

## 2023-12-05 NOTE — DISCHARGE NOTE PROVIDER - CARE PROVIDER_API CALL
Sari Chiang NP in Family [facility]  [address]  [place] [address]  Phone: [phone]  Fax: [phone]  Scheduled Appointment: 12/19/2023 10:00 AM   Sari Chiang NP in Family [facility]  [address]  [place] [address]  Phone: [phone]  Fax: [phone]  Scheduled Appointment: 12/19/2023 10:00 AM

## 2023-12-05 NOTE — H&P ADULT - NSHPPHYSICALEXAM_GEN_ALL_CORE
General: No acute distress, awake and alert  Cardiovascular: Regular rate and rhythm on tele  Pulmonary: No use of accessory muscles    Neurologic:  -Mental status: Awake, alert, oriented to person , place, time, and situation. Speech is fluent to naming, comprehension, and repetition. Able to follow simple and complex commands. Attention/concentration intact. Funds of knowledge appreciated.  -Cranial nerves:   II: Visual fields are full to confrontation.  III, IV, VI: Extraocular movements are intact without nystagmus. Pupils equally round and reactive to light  V:  Facial sensation V1-V3 equal and intact   VII: Face is symmetric with normal eye closure and smile  VIII: Hearing is bilaterally intact   Motor: Normal tone. No pronator drift. Strength bilateral upper extremity 5/5, bilateral lower extremities 4/5.  Sensation: decreased sensation to light touch on dorsal aspect of RUE compared to LUE. Sensation to light touch intact in lower extremities.  Coordination: No dysmetria on finger-to-nose or heel-to-shin bilaterally.    NIHSS: 1 Constitutional: Awake, alert, and in no acute distress   General: No acute distress, awake and alert  Cardiovascular: Regular rate and rhythm on tele  Pulmonary: No use of accessory muscles    Neurologic:  -Mental status: Awake, alert, oriented to person , place, time, and situation. Speech is fluent to naming, comprehension, and repetition. Able to follow simple and complex commands. Attention/concentration intact. Funds of knowledge appreciated.  -Cranial nerves:   II: Visual fields are full to confrontation.  III, IV, VI: Extraocular movements are intact without nystagmus. Pupils equally round and reactive to light  V:  Facial sensation V1-V3 equal and intact   VII: Face is symmetric with normal eye closure and smile  VIII: Hearing is bilaterally intact   Motor: Normal tone. No pronator drift. Strength bilateral upper extremity 5/5, bilateral lower extremities 4/5.  Sensation: Decreased sensation to light touch on the anterior and lateral aspect of the RUE with her palms facing down, sensory deficit spares her hand. Sensation intact in her LUE and in lower extremities.  Coordination: No dysmetria on finger-to-nose bilaterally.  Gait: Deferred    NIHSS: 1

## 2023-12-05 NOTE — ED PROVIDER NOTE - CRITICAL CARE ATTENDING CONTRIBUTION TO CARE
I have discussed the case with the resident/mid level provider. I have personally performed a history, physical exam, and my own medical decision making. I have reviewed the note and agree with the findings and plan with the following exceptions: ____ (insert exceptions) ___.    Upon my evaluation, this patient had a high probability of imminent or life-threatening deterioration due to ___(condition)__, which required my direct attention, intervention, and personal management.    I have personally provided ___ minutes of critical care time exclusive of time spent on separately billable procedures. Time includes review of laboratory data, radiology results, discussion with consultants, and monitoring for potential decompensation. Interventions were performed as documented above.    - Your initials with time stamp Upon my evaluation, this patient had a high probability of imminent or life-threatening deterioration due to numbness of RUE_, which required my direct attention, intervention, and personal management.    I have personally provided __30_ minutes of critical care time exclusive of time spent on separately billable procedures. Time includes review of laboratory data, radiology results, discussion with consultants, and monitoring for potential decompensation. Interventions were performed as documented above.    Patient is a 28-year-old female with no significant past medical history, recently started 1 month ago on spironolactone otherwise takes Loestrin for birth control presents after waking up at 1 AM with numbness and tingling to the right upper extremity.  Patient went to sleep at 11 PM with no symptoms and awoke at 1 AM with the symptoms.     Although patient was beat fast negative in triage after my evaluation I called a code stroke as noted in the stroke note, patient with NIH stroke scale of 1 for decreased sensation to right upper extremity otherwise nonfocal.  Plan is to do stroke imaging, check out electrolytes rule out any electrolyte imbalance, check pregnancy, and dispo pending results

## 2023-12-05 NOTE — ED ADULT NURSE NOTE - IN THE PAST 12 MONTHS HAVE YOU USED DRUGS OTHER THAN THOSE REQUIRED FOR MEDICAL REASON?
Is This A New Presentation, Or A Follow-Up?: Skin Lesions How Severe Is Your Skin Lesion?: moderate Have Your Skin Lesions Been Treated?: not been treated No

## 2023-12-05 NOTE — H&P ADULT - NSHPLABSRESULTS_GEN_ALL_CORE
(12-05 @ 02:46)                      14.1  8.10 )-----------( 254                 40.0    Neutrophils = 4.15 (51.3%)  Lymphocytes = 2.67 (33.0%)  Eosinophils = 0.21 (2.6%)  Basophils = 0.06 (0.7%)  Monocytes = 0.99 (12.2%)  Bands = --%    12-05    139  |  103  |  11  ----------------------------<  101<H>  3.4<L>   |  30  |  0.76    Ca    8.9      05 Dec 2023 02:46  Mg     2.0     12-05    TPro  6.9  /  Alb  3.7  /  TBili  0.2  /  DBili  x   /  AST  21  /  ALT  27  /  AlkPhos  51  12-05    ( 05 Dec 2023 02:46 )   PT: 10.0 sec;   INR: 0.88 ;       PTT:28.8 sec      Urinalysis Basic - ( 05 Dec 2023 02:46 )    Color: x / Appearance: x / SG: x / pH: x  Gluc: 101 mg/dL / Ketone: x  / Bili: x / Urobili: x   Blood: x / Protein: x / Nitrite: x   Leuk Esterase: x / RBC: x / WBC x   Sq Epi: x / Non Sq Epi: x / Bacteria: x    CTH:     IMPRESSION: No acute intracranial hemorrhage, acute transcortical infarction, extra-axial fluid collection, or hydrocephalus.    CTA:    IMPRESSION: No steno-occlusive disease. No evidence of arterial dissection.    MR Head:    IMPRESSION: No acute infarct. Fingerstick Blood Glucose: CAPILLARY BLOOD GLUCOSE      POCT Blood Glucose.: 95 mg/dL (05 Dec 2023 02:51)    LABS:                        14.1   8.10  )-----------( 254      ( 05 Dec 2023 02:46 )             40.0     12-05    139  |  103  |  11  ----------------------------<  101<H>  3.4<L>   |  30  |  0.76    Ca    8.9      05 Dec 2023 02:46  Mg     2.0     12-05    TPro  6.9  /  Alb  3.7  /  TBili  0.2  /  DBili  x   /  AST  21  /  ALT  27  /  AlkPhos  51  12-05    PT/INR - ( 05 Dec 2023 02:46 )   PT: 10.0 sec;   INR: 0.88          PTT - ( 05 Dec 2023 02:46 )  PTT:28.8 sec      Urinalysis Basic - ( 05 Dec 2023 02:46 )    Color: x / Appearance: x / SG: x / pH: x  Gluc: 101 mg/dL / Ketone: x  / Bili: x / Urobili: x   Blood: x / Protein: x / Nitrite: x   Leuk Esterase: x / RBC: x / WBC x   Sq Epi: x / Non Sq Epi: x / Bacteria: x        RADIOLOGY & ADDITIONAL STUDIES:  < from: CT Brain Stroke Protocol (12.05.23 @ 02:50) >      IMPRESSION: No acute intracranial hemorrhage, acute transcortical   infarction, extra-axial fluid collection, or hydrocephalus.    < end of copied text >    < from: CT Angio Brain and Neck Stroke Protocol  w/ IV Cont (12.05.23 @ 03:17) >    IMPRESSION:  No steno-occlusive disease. No evidence of arterial dissection.    < end of copied text >    < from: CT Brain Perfusion Maps Stroke (12.05.23 @ 03:17) >    IMPRESSION: Matching decreased CBF and increased Tmax within the right   frontal lobe involving the right MCA territory. Findings concerning for   acute ischemia. Follow-up the report of the CTA head and neck.    < end of copied text >        -----------------------------------------------------------------------------------------------------------------  IV-tPA (Y/N):  N  Reason IV-tPA not given: OOW

## 2023-12-05 NOTE — DISCHARGE NOTE NURSING/CASE MANAGEMENT/SOCIAL WORK - NSDCPEFALRISK_GEN_ALL_CORE
For information on Fall & Injury Prevention, visit: [email or web address] OR  [email or web address] OR  https://www.cdc.gov/steadi/patient.html For information on Fall & Injury Prevention, visit: [email or web address] OR  [email or web address] OR  https://www.cdc.gov/steadi/patient.html

## 2023-12-05 NOTE — OCCUPATIONAL THERAPY INITIAL EVALUATION ADULT - NSOTDISCHREC_GEN_A_CORE
however if R UE numbness persists patient can benefit from OPOT for sensory reintegration/No skilled OT needs

## 2023-12-05 NOTE — ED PROVIDER NOTE - ENMT, MLM
Test Reason : 

Blood Pressure : ***/*** mmHG

Vent. Rate : 095 BPM     Atrial Rate : 095 BPM

   P-R Int : 156 ms          QRS Dur : 080 ms

    QT Int : 338 ms       P-R-T Axes : 000 102 -82 degrees

   QTc Int : 424 ms

 

UNDETERMINE RHYTHM, ?ATRIAL FIBRILLATION

POSSIBLE RIGHT VENTRICULAR HYPERTROPHY



ABNORMAL ECG

WHEN COMPARED WITH ECG OF 02-MAR-2016 19:51,

PROBABLY UNCHANGED

Confirmed by SALBADOR TUCKER MD ([id number]) on 4/1/2019 11:25:04 AM

 

Referred By:  KEN           Confirmed By:SALBADOR TUCKER MD
Airway patent, Nasal mucosa clear. Mouth with normal mucosa. Throat has no vesicles, no oropharyngeal exudates and uvula is midline.

## 2023-12-08 DIAGNOSIS — R20.0 ANESTHESIA OF SKIN: ICD-10-CM

## 2023-12-08 DIAGNOSIS — E78.5 HYPERLIPIDEMIA, UNSPECIFIED: ICD-10-CM

## 2023-12-08 DIAGNOSIS — G56.91 UNSPECIFIED MONONEUROPATHY OF RIGHT UPPER LIMB: ICD-10-CM

## 2023-12-08 DIAGNOSIS — E87.6 HYPOKALEMIA: ICD-10-CM

## 2023-12-08 DIAGNOSIS — Z88.1 ALLERGY STATUS TO OTHER ANTIBIOTIC AGENTS STATUS: ICD-10-CM

## 2023-12-08 DIAGNOSIS — Z88.0 ALLERGY STATUS TO PENICILLIN: ICD-10-CM

## 2023-12-08 DIAGNOSIS — M54.12 RADICULOPATHY, CERVICAL REGION: ICD-10-CM

## 2023-12-08 DIAGNOSIS — J45.909 UNSPECIFIED ASTHMA, UNCOMPLICATED: ICD-10-CM

## 2023-12-08 DIAGNOSIS — L70.9 ACNE, UNSPECIFIED: ICD-10-CM

## 2023-12-08 DIAGNOSIS — I10 ESSENTIAL (PRIMARY) HYPERTENSION: ICD-10-CM

## 2023-12-15 PROBLEM — Z00.00 ENCOUNTER FOR PREVENTIVE HEALTH EXAMINATION: Status: ACTIVE | Noted: 2023-12-15

## 2024-01-05 ENCOUNTER — APPOINTMENT (OUTPATIENT)
Dept: NEUROLOGY | Facility: CLINIC | Age: 29
End: 2024-01-05

## 2024-01-05 ENCOUNTER — APPOINTMENT (OUTPATIENT)
Dept: NEUROLOGY | Facility: CLINIC | Age: 29
End: 2024-01-05
Payer: COMMERCIAL

## 2024-01-05 VITALS
WEIGHT: 110 LBS | DIASTOLIC BLOOD PRESSURE: 73 MMHG | HEART RATE: 80 BPM | BODY MASS INDEX: 18.78 KG/M2 | HEIGHT: 64 IN | SYSTOLIC BLOOD PRESSURE: 110 MMHG | OXYGEN SATURATION: 95 % | TEMPERATURE: 98.3 F

## 2024-01-05 DIAGNOSIS — Z78.9 OTHER SPECIFIED HEALTH STATUS: ICD-10-CM

## 2024-01-05 DIAGNOSIS — Z80.9 FAMILY HISTORY OF MALIGNANT NEOPLASM, UNSPECIFIED: ICD-10-CM

## 2024-01-05 DIAGNOSIS — Z83.3 FAMILY HISTORY OF DIABETES MELLITUS: ICD-10-CM

## 2024-01-05 DIAGNOSIS — R20.0 ANESTHESIA OF SKIN: ICD-10-CM

## 2024-01-05 PROCEDURE — 99204 OFFICE O/P NEW MOD 45 MIN: CPT

## 2024-01-05 RX ORDER — NORETHINDRONE ACETATE AND ETHINYL ESTRADIOL, ETHINYL ESTRADIOL AND FERROUS FUMARATE 1MG-10(24)
1 MG-10 MCG / KIT ORAL DAILY
Refills: 0 | Status: ACTIVE | COMMUNITY

## 2024-01-05 RX ORDER — SPIRONOLACTONE 50 MG/1
50 TABLET ORAL DAILY
Refills: 0 | Status: ACTIVE | COMMUNITY

## 2024-01-05 NOTE — PHYSICAL EXAM
[FreeTextEntry1] : Alert. Fully oriented. Speech and language are intact. Cranial nerves II-XII are intact. Motor exam reveals intact strength with individual muscle testing in bilateral upper and lower extremities. Sensation is intact to light touch in distal extremities. Finger-to-nose is intact. Rapid alternating movements are normal in the upper and lower extremities. Gait is normal.

## 2024-01-05 NOTE — HISTORY OF PRESENT ILLNESS
[FreeTextEntry1] : Erica Karimi is a 28-year-old female with no significant past medical history, takes Loestrin for birth control and recently started on Spironolactone presented to [facility] ED with numbness/tingling of the RUE now presents for cervical radiculopathy post hospitalization follow up.  [facility] Hospital Course 12/5/23:  HCT: No acute intracranial hemorrhage, acute transcortical infarction, extra-axial fluid collection or hydrocephalus. CTA H/N: No steno-occlusive disease. No evidence of arterial dissection. HCT: Matching decreased CBF and increased Tmax within the right frontal lobe involving the R MCA territory. MRI Head: No acute infarct.   Since discharge, patient reports no new stroke-like symptoms. She has not had any recurrent RUE numbness. She does note that does sleep with her arms behind her back and wonders if that could have contributed to her symptoms. She continues to take Loestrin, Spirolactone and Tumeric daily. BP today 110/73. She follows a healthy diet and remains active. She continues to deal with tendonitis of both her ankles. Family history positive for stroke in paternal grandmother, uncle with PE and maternal grandmother with multiple miscarriages.

## 2024-01-05 NOTE — ASSESSMENT
[FreeTextEntry1] : Erica Karimi is a 28-year-old female with no significant past medical history, takes Loestrin for birth control and recently started on Spironolactone presented to [facility] ED with numbness/tingling of the RUE now presents for cervical radiculopathy post hospitalization follow up. Etiology of symptoms likely peripheral. Physical exam and no reoccurrence of symptoms is reassuring, therefore no further testing/imaging is indicated at this time.   Plan: -RTO PRN or if symptoms reoccur